# Patient Record
Sex: FEMALE | URBAN - METROPOLITAN AREA
[De-identification: names, ages, dates, MRNs, and addresses within clinical notes are randomized per-mention and may not be internally consistent; named-entity substitution may affect disease eponyms.]

---

## 2022-12-12 ENCOUNTER — OFFICE (OUTPATIENT)
Dept: URBAN - METROPOLITAN AREA CLINIC 109 | Facility: CLINIC | Age: 62
Setting detail: OPHTHALMOLOGY
End: 2022-12-12
Payer: COMMERCIAL

## 2022-12-12 DIAGNOSIS — H40.013: ICD-10-CM

## 2022-12-12 DIAGNOSIS — H43.393: ICD-10-CM

## 2022-12-12 PROCEDURE — 92250 FUNDUS PHOTOGRAPHY W/I&R: CPT | Performed by: OPHTHALMOLOGY

## 2022-12-12 PROCEDURE — 92014 COMPRE OPH EXAM EST PT 1/>: CPT | Performed by: OPHTHALMOLOGY

## 2022-12-12 ASSESSMENT — REFRACTION_MANIFEST
OS_CYLINDER: -0.50
OD_SPHERE: +2.25
OS_AXIS: 62
OS_AXIS: 62
OD_SPHERE: +0.75
OS_SPHERE: +2.25
OS_SPHERE: +0.75
OD_AXIS: 62
OD_CYLINDER: -0.50
OS_CYLINDER: -0.50
OD_ADD: +1.50
OD_CYLINDER: -0.50
OS_ADD: +1.50
OD_AXIS: 62

## 2022-12-12 ASSESSMENT — REFRACTION_AUTOREFRACTION
OS_CYLINDER: -0.50
OS_AXIS: 062
OD_AXIS: 067
OD_CYLINDER: +-0.50
OS_SPHERE: +0.75
OD_SPHERE: +0.75

## 2022-12-12 ASSESSMENT — REFRACTION_CURRENTRX
OD_SPHERE: -0.50
OS_OVR_VA: 20/
OD_CYLINDER: -0.50
OD_OVR_VA: 20/
OD_SPHERE: +1.00
OS_CYLINDER: -0.50
OS_SPHERE: -0.50
OS_OVR_VA: 20/
OS_CYLINDER: -0.50
OD_CYLINDER: -0.50
OD_AXIS: 70
OD_AXIS: 65
OS_SPHERE: +1.00
OS_AXIS: 82
OS_AXIS: 62
OD_OVR_VA: 20/

## 2022-12-12 ASSESSMENT — VISUAL ACUITY
OS_BCVA: 20/20
OD_BCVA: 20/20

## 2022-12-12 ASSESSMENT — CONFRONTATIONAL VISUAL FIELD TEST (CVF)
OS_FINDINGS: FULL
OD_FINDINGS: FULL

## 2022-12-12 ASSESSMENT — SPHEQUIV_DERIVED
OS_SPHEQUIV: 0.5
OD_SPHEQUIV: 0.5
OS_SPHEQUIV: 0.5
OD_SPHEQUIV: 2
OS_SPHEQUIV: 2

## 2022-12-12 ASSESSMENT — TONOMETRY
OS_IOP_MMHG: 16
OD_IOP_MMHG: 16

## 2023-07-31 ENCOUNTER — OFFICE (OUTPATIENT)
Dept: URBAN - METROPOLITAN AREA CLINIC 109 | Facility: CLINIC | Age: 63
Setting detail: OPHTHALMOLOGY
End: 2023-07-31
Payer: COMMERCIAL

## 2023-07-31 DIAGNOSIS — H43.393: ICD-10-CM

## 2023-07-31 DIAGNOSIS — T15.12XA: ICD-10-CM

## 2023-07-31 PROCEDURE — 92014 COMPRE OPH EXAM EST PT 1/>: CPT | Performed by: OPHTHALMOLOGY

## 2023-07-31 ASSESSMENT — SPHEQUIV_DERIVED
OD_SPHEQUIV: 2
OS_SPHEQUIV: 0.5
OS_SPHEQUIV: 0.5
OD_SPHEQUIV: 0.5
OS_SPHEQUIV: 2

## 2023-07-31 ASSESSMENT — REFRACTION_AUTOREFRACTION
OS_AXIS: 062
OD_AXIS: 067
OS_SPHERE: +0.75
OS_CYLINDER: -0.50
OD_SPHERE: +0.75
OD_CYLINDER: +-0.50

## 2023-07-31 ASSESSMENT — REFRACTION_CURRENTRX
OD_OVR_VA: 20/
OS_AXIS: 82
OD_AXIS: 70
OS_OVR_VA: 20/
OS_AXIS: 62
OS_SPHERE: -0.50
OS_SPHERE: +1.00
OS_OVR_VA: 20/
OD_CYLINDER: -0.50
OD_SPHERE: -0.50
OD_AXIS: 65
OS_CYLINDER: -0.50
OD_SPHERE: +1.00
OS_CYLINDER: -0.50
OD_OVR_VA: 20/
OD_CYLINDER: -0.50

## 2023-07-31 ASSESSMENT — REFRACTION_MANIFEST
OD_SPHERE: +0.75
OS_AXIS: 62
OS_AXIS: 62
OD_CYLINDER: -0.50
OS_SPHERE: +0.75
OD_SPHERE: +2.25
OD_AXIS: 62
OS_CYLINDER: -0.50
OD_CYLINDER: -0.50
OD_ADD: +1.50
OS_ADD: +1.50
OD_AXIS: 62
OS_SPHERE: +2.25
OS_CYLINDER: -0.50

## 2023-07-31 ASSESSMENT — VISUAL ACUITY
OD_BCVA: 20/20-2
OS_BCVA: 20/20-1

## 2023-07-31 ASSESSMENT — TONOMETRY: OS_IOP_MMHG: 17

## 2023-07-31 ASSESSMENT — CONFRONTATIONAL VISUAL FIELD TEST (CVF)
OS_FINDINGS: FULL
OD_FINDINGS: FULL

## 2023-08-14 PROBLEM — Z00.00 ENCOUNTER FOR PREVENTIVE HEALTH EXAMINATION: Status: ACTIVE | Noted: 2023-08-14

## 2023-08-17 ENCOUNTER — APPOINTMENT (OUTPATIENT)
Dept: ORTHOPEDIC SURGERY | Facility: CLINIC | Age: 63
End: 2023-08-17
Payer: COMMERCIAL

## 2023-08-17 VITALS — BODY MASS INDEX: 21.71 KG/M2 | HEIGHT: 62 IN | WEIGHT: 118 LBS

## 2023-08-17 DIAGNOSIS — M72.2 PLANTAR FASCIAL FIBROMATOSIS: ICD-10-CM

## 2023-08-17 DIAGNOSIS — Z78.9 OTHER SPECIFIED HEALTH STATUS: ICD-10-CM

## 2023-08-17 PROCEDURE — 73630 X-RAY EXAM OF FOOT: CPT | Mod: RT

## 2023-08-17 PROCEDURE — 99214 OFFICE O/P EST MOD 30 MIN: CPT

## 2023-08-17 NOTE — HISTORY OF PRESENT ILLNESS
[9] : 9 [1] : 2 [de-identified] : 08/17/2023: about 1 month of heel pain after gardening session had foot DF for a prolonged period of time, then went on vacation with heavy walking.  Worse getting out of bed first thing in the morning. Pain with WB. No pain at rest. No previous injury, but does mention similar pain, did some HEP and improved on its own with time.  tried aleve with partial relief, stopped a few days. has been WB on flats/sandals. Denies dm/tobacco [] : no [FreeTextEntry1] : right heel

## 2023-08-17 NOTE — IMAGING
[de-identified] : RLE Inspection of the ankle, foot and lower leg is as follows: no abrasions or lacerations, no swelling, no erythema and no gross deformity Palpation of the ankle, foot and lower leg is as follows: Tenderness at plantar fascia insertion. Range of motion of the ankle, foot and lower leg is as follows: dorsiflexion to 20 degrees, plantar flexion to 40 degrees, inversion to 30 degrees and eversion to 20 degrees. Strength testing of the ankle, foot and lower leg is as follows: Ankle dorsiflexion strength is 5/5, Ankle plantar flexion strength is 5/5, Ankle inversion strength is 5/5 and Ankle eversion strength is 5/5. Special Testing of the ankle, foot and lower leg are as follows: no pain with heel compression. Vascular testing of the ankle, foot and lower leg are as follows: Dorsalis Pedis (DP) Pulse is 2+. Sensation testing of the ankle, foot and lower leg are as follows: Sensation present to light touch in all distributions. [Right] : right foot [There are no fractures, subluxations or dislocations. No significant abnormalities are seen] : There are no fractures, subluxations or dislocations. No significant abnormalities are seen

## 2023-08-17 NOTE — ASSESSMENT
[FreeTextEntry1] : The nature of plantar fasciits was discussed with the patient, as was its typically self-limiting nature. The patient was advised to wear gel heel pads, use nsaids if able, and recommended to see physical therapy for a stretching program. f/up after PT if not resolved

## 2024-10-28 ENCOUNTER — OFFICE (OUTPATIENT)
Dept: URBAN - METROPOLITAN AREA CLINIC 109 | Facility: CLINIC | Age: 64
Setting detail: OPHTHALMOLOGY
End: 2024-10-28
Payer: MEDICARE

## 2024-10-28 DIAGNOSIS — H40.013: ICD-10-CM

## 2024-10-28 DIAGNOSIS — H25.13: ICD-10-CM

## 2024-10-28 DIAGNOSIS — H43.393: ICD-10-CM

## 2024-10-28 PROCEDURE — 92020 GONIOSCOPY: CPT | Performed by: OPHTHALMOLOGY

## 2024-10-28 PROCEDURE — 92014 COMPRE OPH EXAM EST PT 1/>: CPT | Performed by: OPHTHALMOLOGY

## 2024-10-28 PROCEDURE — 92250 FUNDUS PHOTOGRAPHY W/I&R: CPT | Performed by: OPHTHALMOLOGY

## 2024-10-28 PROCEDURE — 92083 EXTENDED VISUAL FIELD XM: CPT | Performed by: OPHTHALMOLOGY

## 2024-10-28 ASSESSMENT — REFRACTION_MANIFEST
OD_AXIS: 62
OD_SPHERE: +2.25
OS_AXIS: 62
OD_AXIS: 62
OS_ADD: +1.50
OD_SPHERE: +0.75
OD_CYLINDER: -0.50
OS_CYLINDER: -0.50
OD_ADD: +1.50
OS_SPHERE: +2.25
OD_CYLINDER: -0.50
OS_CYLINDER: -0.50
OS_AXIS: 62
OS_SPHERE: +0.75

## 2024-10-28 ASSESSMENT — REFRACTION_AUTOREFRACTION
OS_AXIS: 062
OD_CYLINDER: +-0.50
OD_AXIS: 067
OD_SPHERE: +0.75
OS_CYLINDER: -0.50
OS_SPHERE: +0.75

## 2024-10-28 ASSESSMENT — REFRACTION_CURRENTRX
OS_OVR_VA: 20/
OS_CYLINDER: -0.50
OD_AXIS: 66
OS_ADD: +1.50
OS_AXIS: 59
OS_CYLINDER: -0.50
OD_SPHERE: +1.00
OD_CYLINDER: -0.50
OS_OVR_VA: 20/
OS_SPHERE: -0.50
OD_OVR_VA: 20/
OD_SPHERE: -0.50
OD_SPHERE: +0.75
OD_AXIS: 65
OD_OVR_VA: 20/
OD_CYLINDER: -0.50
OD_OVR_VA: 20/
OS_SPHERE: +0.75
OS_AXIS: 62
OD_CYLINDER: -0.50
OS_CYLINDER: -0.50
OD_ADD: +1.50
OS_SPHERE: +1.00
OS_OVR_VA: 20/
OS_AXIS: 82
OD_AXIS: 70

## 2024-10-28 ASSESSMENT — VISUAL ACUITY
OD_BCVA: 20/25
OS_BCVA: 20/25

## 2024-10-28 ASSESSMENT — TONOMETRY
OD_IOP_MMHG: 17
OS_IOP_MMHG: 17